# Patient Record
Sex: MALE | Race: BLACK OR AFRICAN AMERICAN | Employment: FULL TIME | URBAN - METROPOLITAN AREA
[De-identification: names, ages, dates, MRNs, and addresses within clinical notes are randomized per-mention and may not be internally consistent; named-entity substitution may affect disease eponyms.]

---

## 2019-06-24 ENCOUNTER — HOSPITAL ENCOUNTER (EMERGENCY)
Age: 49
Discharge: HOME | End: 2019-06-24
Attending: EMERGENCY MEDICINE | Admitting: EMERGENCY MEDICINE
Payer: SELF-PAY

## 2019-06-24 VITALS
RESPIRATION RATE: 16 BRPM | WEIGHT: 220 LBS | BODY MASS INDEX: 33.34 KG/M2 | HEART RATE: 87 BPM | TEMPERATURE: 99.3 F | HEIGHT: 68 IN | SYSTOLIC BLOOD PRESSURE: 157 MMHG | OXYGEN SATURATION: 98 % | DIASTOLIC BLOOD PRESSURE: 93 MMHG

## 2019-06-24 DIAGNOSIS — R21 RASH: Primary | ICD-10-CM

## 2019-06-24 PROCEDURE — 99282 EMERGENCY DEPT VISIT SF MDM: CPT

## 2019-06-24 RX ORDER — TRIAMCINOLONE ACETONIDE 1 MG/G
OINTMENT TOPICAL 2 TIMES DAILY
Qty: 30 G | Refills: 0 | Status: SHIPPED | OUTPATIENT
Start: 2019-06-24

## 2019-06-24 RX ORDER — CEPHALEXIN 500 MG/1
500 CAPSULE ORAL 4 TIMES DAILY
Qty: 40 CAP | Refills: 0 | Status: SHIPPED | OUTPATIENT
Start: 2019-06-24 | End: 2019-07-04

## 2019-06-24 NOTE — ED NOTES
I have reviewed discharge instructions with the patient. The patient verbalized understanding. Patient NAD, VSS and no pain intervention done in ED. Pain is 2/10 at discharge. Patient armband removed and shredded.

## 2019-06-24 NOTE — ED TRIAGE NOTES
Patient states he noticed a \"agrivating bump\" on right inner thigh two days ago, since then has been \"draining\".

## 2019-06-24 NOTE — DISCHARGE INSTRUCTIONS
Patient Education        Rash: Care Instructions  Your Care Instructions  A rash is any irritation or inflammation of the skin. Rashes have many possible causes, including allergy, infection, illness, heat, and emotional stress. Follow-up care is a key part of your treatment and safety. Be sure to make and go to all appointments, and call your doctor if you are having problems. It's also a good idea to know your test results and keep a list of the medicines you take. How can you care for yourself at home? · Wash the area with water only. Soap can make dryness and itching worse. Pat dry. · Put cold, wet cloths on the rash to reduce itching. · Keep cool, and stay out of the sun. · Leave the rash open to the air as much of the time as possible. · Sometimes petroleum jelly (Vaseline) can help relieve the discomfort caused by a rash. A moisturizing lotion, such as Cetaphil, also may help. Calamine lotion may help for rashes caused by contact with something (such as a plant or soap) that irritated the skin. Use it 3 or 4 times a day. · If your doctor prescribed a cream, use it as directed. If your doctor prescribed medicine, take it exactly as directed. · If your rash itches so badly that it interferes with your normal activities, take an over-the-counter antihistamine, such as diphenhydramine (Benadryl) or loratadine (Claritin). Read and follow all instructions on the label. When should you call for help? Call your doctor now or seek immediate medical care if:    · You have signs of infection, such as:  ? Increased pain, swelling, warmth, or redness. ? Red streaks leading from the area. ? Pus draining from the area. ? A fever.     · You have joint pain along with the rash.    Watch closely for changes in your health, and be sure to contact your doctor if:    · Your rash is changing or getting worse.  For example, call if you have pain along with the rash, the rash is spreading, or you have new blisters.     · You do not get better after 1 week. Where can you learn more? Go to http://eric-carter.info/. Enter M028 in the search box to learn more about \"Rash: Care Instructions. \"  Current as of: April 17, 2018  Content Version: 11.9  © 8668-3590 [employer]. Care instructions adapted under license by [employer] (which disclaims liability or warranty for this information). If you have questions about a medical condition or this instruction, always ask your healthcare professional. [address] any warranty or liability for your use of this information.

## 2019-06-24 NOTE — ED PROVIDER NOTES
[facility] EMERGENCY DEPT      2:07 PM    Date: 6/24/2019  Patient Name: Tenzin Saenz    History of Presenting Illness     Chief Complaint   Patient presents with    Abscess       50 y.o. male with noted past medical history who presents to the emergency department c/o a rash to his right thigh for the past 2 days. Pt states it has a mild constant itch, but is not painful. He notes it having some clear drainage at times. Denies fever, chills, known inset bite, purulent drainage, redness, warmth, or other symptoms at this time. Patient denies any other associated signs or symptoms. Patient denies any other complaints. Nursing notes regarding the HPI and triage nursing notes were reviewed. Current Outpatient Medications   Medication Sig Dispense Refill    triamcinolone acetonide (KENALOG) 0.1 % ointment Apply  to affected area two (2) times a day. use thin layer 30 g 0    cephALEXin (KEFLEX) 500 mg capsule Take 1 Cap by mouth four (4) times daily for 10 days. 40 Cap 0    metFORMIN (GLUCOPHAGE) 1,000 mg tablet Take 1,000 mg by mouth two (2) times daily (with meals).  LISINOPRIL PO Take  by mouth.  glipiZIDE (GLUCOTROL) 5 mg tablet Take 5 mg by mouth two (2) times a day.  SITAGLIPTIN PHOSPHATE (JANUVIA PO) Take  by mouth. Past History     Past Medical History:  Past Medical History:   Diagnosis Date    Diabetes [facility])        Past Surgical History:  No past surgical history on file. Family History:  No family history on file. Social History:  Social History     Tobacco Use    Smoking status: Not on file   Substance Use Topics    Alcohol use: Not on file    Drug use: Not on file       Allergies:  No Known Allergies    Patient's primary care provider (as noted in EPIC):  Veronica Davenport MD    Review of Systems   Constitutional:  Denies malaise, fever, chills. Extremity/MS:  See skin.    Neuro:  Denies headache, LOC, dizziness, neurologic symptoms/deficits/paresthesias. Skin: + rash to right thigh. All other systems negative as reviewed. Visit Vitals  BP (!) 157/93 (BP 1 Location: Right arm, BP Patient Position: At rest)   Pulse 87   Temp 99.3 °F (37.4 °C)   Resp 16   Ht 5' 8\" (1.727 m)   Wt 99.8 kg (220 lb)   SpO2 98%   BMI 33.45 kg/m²       PHYSICAL EXAM:    CONSTITUTIONAL:  Alert, in no apparent distress;  well developed;  well nourished. HEAD:  Normocephalic, atraumatic. EYES:  EOMI. Non-icteric sclera. Normal conjunctiva. ENTM:  Mouth: mucous membranes moist.  NECK:  Supple  RESPIRATORY:  Chest clear, equal breath sounds, good air movement. CARDIOVASCULAR:  Regular rate and rhythm. No murmurs, rubs, or gallops. UPPER EXT:  Normal inspection. LOWER EXT: See skin. Distal pulses intact. NEURO:  Moves all four extremities, and grossly normal motor exam.  SKIN:  Right medial thigh with 2cm skin colored plaque with minimal underlying induration, no drainage/erythema/edema. PSYCH:  Alert and normal affect. DIFFERENTIAL DIAGNOSES/ MEDICAL DECISION MAKING:  Urticaria, viral rash, contact dermatitis, bacterial infection, fungal/ candidal rash, versus other etiologies or a combination of the above. IMPRESSION AND MEDICAL DECISION MAKING:  Based upon the patient's presentation with noted HPI and PE, along with the work up done in the emergency department, I believe that the patient is having noted rash. It appears to be more allergic, however will cover with keflex in addition to the TAC ointment to cover possible cellulitis. Pt to f/u with PCP. Diagnosis:   1.  Rash      Disposition: Discharge    Follow-up Information     Follow up With Specialties Details Why Contact Info    Ashlee Penn., MD [facility] In 3 days  [address] EMERGENCY DEPT Emergency Medicine  If symptoms worsen [address]  [phone]          Discharge Medication List as of 6/24/2019 12:22 PM      START taking these medications    Details   triamcinolone acetonide (KENALOG) 0.1 % ointment Apply  to affected area two (2) times a day. use thin layer, Print, Disp-30 g, R-0      cephALEXin (KEFLEX) 500 mg capsule Take 1 Cap by mouth four (4) times daily for 10 days. , Print, Disp-40 Cap, R-0         CONTINUE these medications which have NOT CHANGED    Details   metFORMIN (GLUCOPHAGE) 1,000 mg tablet Take 1,000 mg by mouth two (2) times daily (with meals). Historical Med, 1,000 mg      LISINOPRIL PO Take  by mouth. Historical Med      glipiZIDE (GLUCOTROL) 5 mg tablet Take 5 mg by mouth two (2) times a day. Historical Med, 5 mg      SITAGLIPTIN PHOSPHATE (JANUVIA PO) Take  by mouth. [address] [address]